# Patient Record
Sex: FEMALE | Race: BLACK OR AFRICAN AMERICAN | Employment: UNEMPLOYED | URBAN - METROPOLITAN AREA
[De-identification: names, ages, dates, MRNs, and addresses within clinical notes are randomized per-mention and may not be internally consistent; named-entity substitution may affect disease eponyms.]

---

## 2023-11-11 ENCOUNTER — HOSPITAL ENCOUNTER (EMERGENCY)
Facility: HOSPITAL | Age: 23
Discharge: PSYCHIATRIC OR SUBSTANCE USE FACILITY | End: 2023-11-12
Attending: STUDENT IN AN ORGANIZED HEALTH CARE EDUCATION/TRAINING PROGRAM
Payer: COMMERCIAL

## 2023-11-11 ENCOUNTER — APPOINTMENT (OUTPATIENT)
Facility: HOSPITAL | Age: 23
End: 2023-11-11
Payer: COMMERCIAL

## 2023-11-11 DIAGNOSIS — F99 PSYCHIATRIC PROBLEM: Primary | ICD-10-CM

## 2023-11-11 LAB
ALBUMIN SERPL-MCNC: 4.3 G/DL (ref 3.5–5.2)
ALBUMIN/GLOB SERPL: 1.3 (ref 1.1–2.2)
ALP SERPL-CCNC: 107 U/L (ref 35–104)
ALT SERPL-CCNC: 5 U/L (ref 10–35)
AMPHET UR QL SCN: NEGATIVE
ANION GAP SERPL CALC-SCNC: 12 MMOL/L (ref 5–15)
APPEARANCE UR: CLEAR
AST SERPL-CCNC: 20 U/L (ref 10–35)
BACTERIA URNS QL MICRO: ABNORMAL /HPF
BARBITURATES UR QL SCN: NEGATIVE
BASOPHILS # BLD: 0 K/UL (ref 0–1)
BASOPHILS NFR BLD: 0 % (ref 0–1)
BENZODIAZ UR QL: NEGATIVE
BILIRUB SERPL-MCNC: 0.2 MG/DL (ref 0.2–1)
BILIRUB UR QL: NEGATIVE
BUN SERPL-MCNC: 4 MG/DL (ref 6–20)
BUN/CREAT SERPL: 6 (ref 12–20)
CALCIUM SERPL-MCNC: 9.6 MG/DL (ref 8.6–10)
CANNABINOIDS UR QL SCN: POSITIVE
CHLORIDE SERPL-SCNC: 100 MMOL/L (ref 98–107)
CO2 SERPL-SCNC: 25 MMOL/L (ref 22–29)
COCAINE UR QL SCN: NEGATIVE
COLOR UR: ABNORMAL
CREAT SERPL-MCNC: 0.71 MG/DL (ref 0.5–0.9)
DIFFERENTIAL METHOD BLD: ABNORMAL
EOSINOPHIL # BLD: 0.1 K/UL (ref 0–0.4)
EOSINOPHIL NFR BLD: 1 %
EPITH CASTS URNS QL MICRO: ABNORMAL /LPF
ERYTHROCYTE [DISTWIDTH] IN BLOOD BY AUTOMATED COUNT: 12.7 % (ref 11.5–14.5)
ETHANOL SERPL-MCNC: <10 MG/DL (ref 0–10)
FLUAV RNA SPEC QL NAA+PROBE: NOT DETECTED
FLUBV RNA SPEC QL NAA+PROBE: NOT DETECTED
GLOBULIN SER CALC-MCNC: 3.2 G/DL (ref 2–4)
GLUCOSE SERPL-MCNC: 184 MG/DL (ref 65–100)
GLUCOSE UR STRIP.AUTO-MCNC: NEGATIVE MG/DL
HCG UR QL: NEGATIVE
HCT VFR BLD AUTO: 38.6 % (ref 35–47)
HGB BLD-MCNC: 12.9 G/DL (ref 11.5–16)
HGB UR QL STRIP: NEGATIVE
IMM GRANULOCYTES # BLD AUTO: 0 K/UL (ref 0–0.04)
IMM GRANULOCYTES NFR BLD AUTO: 0 % (ref 0–0.5)
KETONES UR QL STRIP.AUTO: NEGATIVE MG/DL
LACTATE SERPL-SCNC: 1.3 MMOL/L (ref 0.4–2)
LEUKOCYTE ESTERASE UR QL STRIP.AUTO: NEGATIVE
LIPASE SERPL-CCNC: 10 U/L (ref 13–60)
LYMPHOCYTES # BLD: 2.8 K/UL (ref 0.8–3.5)
LYMPHOCYTES NFR BLD: 29 % (ref 12–49)
Lab: ABNORMAL
MCH RBC QN AUTO: 30.8 PG (ref 26–34)
MCHC RBC AUTO-ENTMCNC: 33.4 G/DL (ref 30–36.5)
MCV RBC AUTO: 92.1 FL (ref 80–99)
METHADONE UR QL: NEGATIVE
MONOCYTES # BLD: 0.8 K/UL (ref 0–1)
MONOCYTES NFR BLD: 8 % (ref 5–13)
MUCOUS THREADS URNS QL MICRO: ABNORMAL /LPF
NEUTS SEG # BLD: 5.9 K/UL (ref 1.8–8)
NEUTS SEG NFR BLD: 62 % (ref 32–75)
NITRITE UR QL STRIP.AUTO: NEGATIVE
NRBC # BLD: 0 K/UL (ref 0–0.01)
NRBC BLD-RTO: 0 PER 100 WBC
OPIATES UR QL: NEGATIVE
PCP UR QL: NEGATIVE
PH UR STRIP: 6.5 (ref 5–8)
PLATELET # BLD AUTO: 333 K/UL (ref 150–400)
PMV BLD AUTO: 9.4 FL (ref 8.9–12.9)
POTASSIUM SERPL-SCNC: 3.4 MMOL/L (ref 3.5–5.1)
PROT SERPL-MCNC: 7.5 G/DL (ref 6.4–8.3)
PROT UR STRIP-MCNC: NEGATIVE MG/DL
RBC # BLD AUTO: 4.19 M/UL (ref 3.8–5.2)
RBC #/AREA URNS HPF: ABNORMAL /HPF
SARS-COV-2 RNA RESP QL NAA+PROBE: NOT DETECTED
SODIUM SERPL-SCNC: 137 MMOL/L (ref 136–145)
SP GR UR REFRACTOMETRY: 1.01 (ref 1–1.03)
URINE CULTURE IF INDICATED: ABNORMAL
UROBILINOGEN UR QL STRIP.AUTO: 0.2 EU/DL (ref 0.2–1)
WBC # BLD AUTO: 9.6 K/UL (ref 3.6–11)
WBC URNS QL MICRO: ABNORMAL /HPF (ref 0–4)

## 2023-11-11 PROCEDURE — 81001 URINALYSIS AUTO W/SCOPE: CPT

## 2023-11-11 PROCEDURE — 73090 X-RAY EXAM OF FOREARM: CPT

## 2023-11-11 PROCEDURE — 85025 COMPLETE CBC W/AUTO DIFF WBC: CPT

## 2023-11-11 PROCEDURE — 71045 X-RAY EXAM CHEST 1 VIEW: CPT

## 2023-11-11 PROCEDURE — 83605 ASSAY OF LACTIC ACID: CPT

## 2023-11-11 PROCEDURE — 80053 COMPREHEN METABOLIC PANEL: CPT

## 2023-11-11 PROCEDURE — 80307 DRUG TEST PRSMV CHEM ANLYZR: CPT

## 2023-11-11 PROCEDURE — 90791 PSYCH DIAGNOSTIC EVALUATION: CPT

## 2023-11-11 PROCEDURE — 82077 ASSAY SPEC XCP UR&BREATH IA: CPT

## 2023-11-11 PROCEDURE — 87636 SARSCOV2 & INF A&B AMP PRB: CPT

## 2023-11-11 PROCEDURE — 96374 THER/PROPH/DIAG INJ IV PUSH: CPT

## 2023-11-11 PROCEDURE — 99285 EMERGENCY DEPT VISIT HI MDM: CPT

## 2023-11-11 PROCEDURE — 81025 URINE PREGNANCY TEST: CPT

## 2023-11-11 PROCEDURE — 83690 ASSAY OF LIPASE: CPT

## 2023-11-11 PROCEDURE — 70450 CT HEAD/BRAIN W/O DYE: CPT

## 2023-11-11 PROCEDURE — 36415 COLL VENOUS BLD VENIPUNCTURE: CPT

## 2023-11-11 RX ORDER — LORAZEPAM 2 MG/ML
1 INJECTION INTRAMUSCULAR ONCE
Status: COMPLETED | OUTPATIENT
Start: 2023-11-11 | End: 2023-11-12

## 2023-11-11 ASSESSMENT — PAIN - FUNCTIONAL ASSESSMENT: PAIN_FUNCTIONAL_ASSESSMENT: 0-10

## 2023-11-11 NOTE — ED NOTES
Pt transported to CT via stretcher by rad tech.       Lillie People, RN  11/11/23 2021 Marian Lara RN  11/11/23 3950
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
## 2023-11-11 NOTE — ED NOTES
Consult placed to [facility] by this Charge LEXUS.      Ashley Solomon RN  11/11/23 0440

## 2023-11-11 NOTE — PROGRESS NOTES
[facility] assessment completed, and suicide risk level noted to be low. Primary Nurse Rhonda Lu and Charge Nurse Rome Cannon and Physician Dr Rodrigo Campbell notified. Concerns not observed. Security/Off- has not been notified.

## 2023-11-11 NOTE — ED NOTES
Pt talking incoherently in room with sister about saving babies, men, and buying shoes off the Internet.       Zully Larson RN  11/11/23 6451
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
## 2023-11-12 VITALS
OXYGEN SATURATION: 100 % | SYSTOLIC BLOOD PRESSURE: 131 MMHG | BODY MASS INDEX: 19.63 KG/M2 | RESPIRATION RATE: 15 BRPM | TEMPERATURE: 98.4 F | DIASTOLIC BLOOD PRESSURE: 85 MMHG | HEART RATE: 105 BPM | HEIGHT: 60 IN | WEIGHT: 100 LBS

## 2023-11-12 PROCEDURE — 6360000002 HC RX W HCPCS: Performed by: STUDENT IN AN ORGANIZED HEALTH CARE EDUCATION/TRAINING PROGRAM

## 2023-11-12 PROCEDURE — 96374 THER/PROPH/DIAG INJ IV PUSH: CPT

## 2023-11-12 RX ADMIN — LORAZEPAM 1 MG: 2 INJECTION INTRAMUSCULAR; INTRAVENOUS at 00:19

## 2023-11-12 ASSESSMENT — PAIN - FUNCTIONAL ASSESSMENT: PAIN_FUNCTIONAL_ASSESSMENT: NONE - DENIES PAIN

## 2023-11-12 NOTE — BSMART NOTE
Patient's packet faxed to [facility]) Crisis/[facility]. CM will be assessing patient shortly.

## 2023-11-12 NOTE — ED NOTES
Pt asked if her father was out in the waiting area. Pt notified that father was not out there. Pt stated that she would message her sister. Pt resting comfortably in stretcher. Respirations unlabored, no distress noted.       Luis Gao RN  11/12/23 4713

## 2023-11-12 NOTE — ED NOTES
Pt listening to music with her sister at bedside, pt talking about a kashmir that she wants to see and said she needs him here now, pt said she wont rest until he is here, pt mildly agitated.       Jesús Larson [place]  11/12/23 3617
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
## 2023-11-12 NOTE — ED NOTES
[facility] in the process of obtaining collateral and will be calling back in about 30 - 60 min to set up telehealth session.      Junior Lacy RN  11/11/23 2018

## 2023-11-12 NOTE — ED NOTES
Patient's sister, Gerry Toledo, made aware of patient placement at this time.  [address] [facility], RN  11/12/23 2311

## 2023-11-12 NOTE — ED NOTES
Pt up to bathroom, ambulates independently. Provided pt with two warm blankets.      Zeynep Guadarrama RN  11/12/23 5160

## 2023-11-12 NOTE — ED NOTES
[facility] called for an update on plan of care, waiting for a return call     Jennifer Dee RN  11/11/23 2007

## 2023-11-12 NOTE — ED NOTES
Bedside and Verbal shift change report given to Franki Washington (oncoming nurse) by Hector Lezama (offgoing nurse). Pt has no visitors at bedside. Resting quietly in stretcher.        Jhonny Lomas RN  11/12/23 2551

## 2023-11-12 NOTE — ED NOTES
Pt resting in stretcher with eyes closed. Respirations unlabored, no distress noted. Nothing needed or requested by patient.      Rina Rabago RN  11/12/23 6878

## 2023-11-12 NOTE — ED NOTES
TRANSFER - OUT REPORT:    Verbal report given to Jorje Ortega on Jamie Bond  being transferred to [facility] for routine progression of patient care       Report consisted of patient's Situation, Background, Assessment and   Recommendations(SBAR). Information from the following report(s) Nurse Handoff Report, ED Encounter Summary, ED SBAR, Adult Overview, Intake/Output, MAR, Recent Results, and Neuro Assessment was reviewed with the receiving nurse. Kennedy Fall Assessment:    Presents to emergency department  because of falls (Syncope, seizure, or loss of consciousness): No  Age > 70: No  Altered Mental Status, Intoxication with alcohol or substance confusion (Disorientation, impaired judgment, poor safety awaremess, or inability to follow instructions): No  Impaired Mobility: Ambulates or transfers with assistive devices or assistance; Unable to ambulate or transer.: No  Nursing Judgement: No          Lines:   Peripheral IV 11/11/23 Right;Ventral Forearm (Active)        Opportunity for questions and clarification was provided.                Joselito Tucker RN  11/12/23 8830

## 2023-11-13 PROBLEM — F20.81 SCHIZOPHRENIFORM DISORDER (HCC): Status: ACTIVE | Noted: 2023-11-13

## 2024-05-06 ENCOUNTER — HOSPITAL ENCOUNTER (EMERGENCY)
Facility: HOSPITAL | Age: 24
Discharge: HOME | End: 2024-05-07
Attending: EMERGENCY MEDICINE
Payer: OTHER GOVERNMENT

## 2024-05-06 ENCOUNTER — HOSPITAL ENCOUNTER (EMERGENCY)
Facility: HOSPITAL | Age: 24
Discharge: HOME | End: 2024-05-06
Attending: EMERGENCY MEDICINE
Payer: OTHER GOVERNMENT

## 2024-05-06 VITALS
HEART RATE: 120 BPM | BODY MASS INDEX: 29.88 KG/M2 | RESPIRATION RATE: 18 BRPM | TEMPERATURE: 98 F | SYSTOLIC BLOOD PRESSURE: 106 MMHG | OXYGEN SATURATION: 100 % | DIASTOLIC BLOOD PRESSURE: 81 MMHG | WEIGHT: 153 LBS

## 2024-05-06 DIAGNOSIS — Z76.0 ENCOUNTER FOR MEDICATION REFILL: Primary | ICD-10-CM

## 2024-05-06 DIAGNOSIS — F20.9 SCHIZOPHRENIA, UNSPECIFIED TYPE (HCC): ICD-10-CM

## 2024-05-06 DIAGNOSIS — F20.9 SCHIZOPHRENIA, UNSPECIFIED TYPE (HCC): Primary | ICD-10-CM

## 2024-05-06 PROCEDURE — 99283 EMERGENCY DEPT VISIT LOW MDM: CPT

## 2024-05-06 RX ORDER — CLOZAPINE 100 MG/1
300 TABLET ORAL NIGHTLY
Qty: 12 TABLET | Refills: 0 | Status: SHIPPED | OUTPATIENT
Start: 2024-05-06 | End: 2024-05-10

## 2024-05-06 RX ORDER — CLOZAPINE 100 MG/1
300 TABLET ORAL ONCE
Status: DISCONTINUED | OUTPATIENT
Start: 2024-05-06 | End: 2024-05-06 | Stop reason: HOSPADM

## 2024-05-06 RX ORDER — CLOZAPINE 100 MG/1
300 TABLET ORAL NIGHTLY
Qty: 9 TABLET | Refills: 0 | Status: SHIPPED | OUTPATIENT
Start: 2024-05-06 | End: 2024-05-06

## 2024-05-06 ASSESSMENT — PAIN - FUNCTIONAL ASSESSMENT: PAIN_FUNCTIONAL_ASSESSMENT: 0-10

## 2024-05-06 ASSESSMENT — PAIN SCALES - GENERAL: PAINLEVEL_OUTOF10: 0

## 2024-05-07 VITALS
SYSTOLIC BLOOD PRESSURE: 110 MMHG | HEART RATE: 114 BPM | OXYGEN SATURATION: 99 % | TEMPERATURE: 98.1 F | RESPIRATION RATE: 18 BRPM | BODY MASS INDEX: 29.88 KG/M2 | DIASTOLIC BLOOD PRESSURE: 74 MMHG | WEIGHT: 153 LBS

## 2024-05-07 LAB
BASOPHILS # BLD: 0 K/UL (ref 0–0.1)
BASOPHILS NFR BLD: 0 % (ref 0–1)
COMMENT:: NORMAL
DIFFERENTIAL METHOD BLD: NORMAL
EOSINOPHIL # BLD: 0.3 K/UL (ref 0–0.4)
EOSINOPHIL NFR BLD: 3 % (ref 0–7)
ERYTHROCYTE [DISTWIDTH] IN BLOOD BY AUTOMATED COUNT: 12.9 % (ref 11.5–14.5)
HCT VFR BLD AUTO: 41 % (ref 35–47)
HGB BLD-MCNC: 14 G/DL (ref 11.5–16)
IMM GRANULOCYTES # BLD AUTO: 0 K/UL (ref 0–0.04)
IMM GRANULOCYTES NFR BLD AUTO: 0 % (ref 0–0.5)
LYMPHOCYTES # BLD: 2.6 K/UL (ref 0.8–3.5)
LYMPHOCYTES NFR BLD: 27 % (ref 12–49)
MCH RBC QN AUTO: 31.2 PG (ref 26–34)
MCHC RBC AUTO-ENTMCNC: 34.1 G/DL (ref 30–36.5)
MCV RBC AUTO: 91.3 FL (ref 80–99)
MONOCYTES # BLD: 0.6 K/UL (ref 0–1)
MONOCYTES NFR BLD: 7 % (ref 5–13)
NEUTS SEG # BLD: 5.8 K/UL (ref 1.8–8)
NEUTS SEG NFR BLD: 63 % (ref 32–75)
NRBC # BLD: 0 K/UL (ref 0–0.01)
NRBC BLD-RTO: 0 PER 100 WBC
PLATELET # BLD AUTO: 270 K/UL (ref 150–400)
PMV BLD AUTO: 9.5 FL (ref 8.9–12.9)
RBC # BLD AUTO: 4.49 M/UL (ref 3.8–5.2)
SPECIMEN HOLD: NORMAL
WBC # BLD AUTO: 9.4 K/UL (ref 3.6–11)

## 2024-05-07 PROCEDURE — 6370000000 HC RX 637 (ALT 250 FOR IP): Performed by: PHYSICIAN ASSISTANT

## 2024-05-07 PROCEDURE — 85025 COMPLETE CBC W/AUTO DIFF WBC: CPT

## 2024-05-07 PROCEDURE — 36415 COLL VENOUS BLD VENIPUNCTURE: CPT

## 2024-05-07 RX ORDER — CLOZAPINE 100 MG/1
300 TABLET ORAL
Status: COMPLETED | OUTPATIENT
Start: 2024-05-07 | End: 2024-05-07

## 2024-05-07 RX ADMIN — CLOZAPINE 300 MG: 100 TABLET ORAL at 01:21

## 2024-05-07 ASSESSMENT — PAIN SCALES - GENERAL: PAINLEVEL_OUTOF10: 0

## 2024-05-07 NOTE — ED PROVIDER NOTES
[facility] EMERGENCY DEP  EMERGENCY DEPARTMENT ENCOUNTER      Pt Name: Louise Lutz  MRN: [id number]  Birthdate 2000  Date of evaluation: 5/6/2024  Provider: PARISH Crow    CHIEF COMPLAINT       Chief Complaint   Patient presents with    Medication Refill         HISTORY OF PRESENT ILLNESS   (Location/Symptom, Timing/Onset, Context/Setting, Quality, Duration, Modifying Factors, Severity)  Note limiting factors.   24-year-old female presenting to the ED for medication refill.  Sister, POA, at bedside.  \"My sister went to [facility], which is a mental health [facility] 4/27.\"  Was released with scheduled teleheatlh appt.  Given bridge of medication.  Appt isn't until May 9th.  Called telehealth people.  Doesn't have PCP.  Called [facility], [facility] services.  [facility] also gave resources.  Last doses yesterday  - take nightly. Clozaril 300mg nightly.  Needs bridge until telehealth appt May 9th.  Dx schizophrenia.  Both patient and sister notes that the patient is doing well, denies any acute medical or mental health concerns, denies SI or HI, notes that they are only here because she needs a couple of days of her medication to get her to her telehealth appointment on May 9.    Past medical history: As above  Social history: Non-smoker    The history is provided by the patient and a relative.         Review of External Medical Records:     Nursing Notes were reviewed.    REVIEW OF SYSTEMS    (2-9 systems for level 4, 10 or more for level 5)     Review of Systems   Psychiatric/Behavioral:  Negative for agitation and behavioral problems.        Except as noted above the remainder of the review of systems was reviewed and negative.       PAST MEDICAL HISTORY   No past medical history on file.      SURGICAL HISTORY     No past surgical history on file.      CURRENT MEDICATIONS       Previous Medications    BENZTROPINE (COGENTIN) 0.5 MG TABLET    Take 1 tablet by mouth 2 times

## 2024-05-07 NOTE — ED PROVIDER NOTES
[facility] EMERGENCY DEP  EMERGENCY DEPARTMENT ENCOUNTER      Pt Name: Louise Lutz  MRN: [id number]  Birthdate 2000  Date of evaluation: 5/6/2024  Provider: PARISH Crow    CHIEF COMPLAINT       Chief Complaint   Patient presents with    Labs Only                HISTORY OF PRESENT ILLNESS   (Location/Symptom, Timing/Onset, Context/Setting, Quality, Duration, Modifying Factors, Severity)  Note limiting factors.   24-year-old female presenting for med refill.  Was recently discharged from mental health facility, was written for Clozaril, unfortunately there was some confusion about the timing of her follow-up telehealth appointment and she took her last dose of medication today, does not have an appointment until 5/9.  Denies any complaints at this time and notes that she has been feeling well.    Past medical history: Schizophrenia    Some history obtained from sister at bedside who is POA    The history is provided by the patient and a relative.         Review of External Medical Records:     Nursing Notes were reviewed.    REVIEW OF SYSTEMS    (2-9 systems for level 4, 10 or more for level 5)     Review of Systems    Except as noted above the remainder of the review of systems was reviewed and negative.       PAST MEDICAL HISTORY   No past medical history on file.      SURGICAL HISTORY     No past surgical history on file.      CURRENT MEDICATIONS       Discharge Medication List as of 5/7/2024  1:18 AM        CONTINUE these medications which have NOT CHANGED    Details   cloZAPine (CLOZARIL) 100 MG tablet Take 3 tablets by mouth nightly for 4 days, Disp-12 tablet, R-0Normal      traZODone (DESYREL) 50 MG tablet Take 1 tablet by mouth nightly as needed for Sleep, Disp-3 tablet, R-0Normal      benztropine (COGENTIN) 0.5 MG tablet Take 1 tablet by mouth 2 times daily, Disp-6 tablet, R-0Normal      OLANZapine (ZYPREXA) 20 MG tablet Take 1 tablet by mouth nightly, Disp-3 tablet, R-0Normal

## 2024-05-07 NOTE — ED TRIAGE NOTES
Pt comes in from home with CC of needing a medication refill of her psychiatric medications. Pt had lost dose yesterday.

## 2024-05-07 NOTE — DISCHARGE INSTRUCTIONS
Return for new or worsening symptoms.  Tried to get a hold of the mental health facility tomorrow to see if they can write you for a couple of doses of the medication to bridge you to your appointment on the ninth.  If you absolutely cannot find any other way to get the medication, you may return here for an additional dose.

## 2024-05-07 NOTE — ED NOTES
MD reviewed discharge instructions and options with patient and patient verbalized understanding. RN reviewed discharge instructions using teachback method. Pt ambulated to exit without difficulty and in no signs of acute distress escorted by female , and she  will drive home. No complaints or needs expressed at this time. Patient was counseled on medications prescribed at discharge. VSS, verbalized relief from most intense pain. Patient to call her Psychiatrist in the morning for appointment.

## 2024-05-07 NOTE — ED TRIAGE NOTES
Triage: Pt arrives ambulatory from home needing a dose of her psychiatric medication until she can do a follow up appointment with her teledoc.

## 2024-05-07 NOTE — ED NOTES
9:59 PM  I have evaluated the patient as the Provider in Rapid Medical Evaluation (RME). I have reviewed her vital signs and the triage nurse assessment. I have talked with the patient and any available family and advised that I am the provider in triage and have ordered the appropriate study to initiate their work up based on the clinical presentation during my assessment. I have advised that the patient will be accommodated in the Main ED as soon as possible. I have also requested to contact the triage nurse or myself immediately if the patient experiences any changes in their condition during this brief waiting period.    Sister, LOPEZ, at bedside.  Ssiter notes that she is here for mental health problem - \"my sister went to [facility], which is a mental health recovery center 4/27.\"  Was released with scheduled teleheatlh appt.  Given bridge of medication.  Appt isn't until May 9th.  Called telehealth people.  Doesn't have PCP.  Called [facility] services.  [facility] also gave resources.  Last doses yesterday  - take nightly. Clozaril 300mg nightly.  Needs bridge until telehealth appt May 9th.  Dx schizophrenia.      PARISH Crow Lindsay H, PA  05/07/24 0141
